# Patient Record
Sex: FEMALE | Race: WHITE | NOT HISPANIC OR LATINO | Employment: OTHER | URBAN - NONMETROPOLITAN AREA
[De-identification: names, ages, dates, MRNs, and addresses within clinical notes are randomized per-mention and may not be internally consistent; named-entity substitution may affect disease eponyms.]

---

## 2024-06-26 PROBLEM — U07.1 COVID-19 AFFECTING PREGNANCY IN THIRD TRIMESTER: Status: ACTIVE | Noted: 2024-06-26

## 2024-06-26 PROBLEM — O42.00 PROM WITH ONSET OF LABOR WITHIN 24 HOURS OF RUPTURE: Status: ACTIVE | Noted: 2024-06-26

## 2024-06-26 PROBLEM — Z3A.39 39 WEEKS GESTATION OF PREGNANCY: Status: ACTIVE | Noted: 2024-06-26

## 2024-06-26 PROBLEM — O98.513 COVID-19 AFFECTING PREGNANCY IN THIRD TRIMESTER: Status: ACTIVE | Noted: 2024-06-26

## 2024-06-26 PROBLEM — O26.893 VAGINAL DISCHARGE DURING PREGNANCY IN THIRD TRIMESTER: Status: ACTIVE | Noted: 2024-06-26

## 2024-06-27 PROBLEM — O98.513 COVID-19 AFFECTING PREGNANCY IN THIRD TRIMESTER: Status: RESOLVED | Noted: 2024-06-26 | Resolved: 2024-06-27

## 2024-06-27 PROBLEM — U07.1 COVID-19 AFFECTING PREGNANCY IN THIRD TRIMESTER: Status: RESOLVED | Noted: 2024-06-26 | Resolved: 2024-06-27

## 2024-06-27 PROBLEM — Z3A.39 39 WEEKS GESTATION OF PREGNANCY: Status: RESOLVED | Noted: 2024-06-26 | Resolved: 2024-06-27

## 2024-06-27 PROBLEM — Z34.90 PREGNANCY: Status: RESOLVED | Noted: 2023-12-03 | Resolved: 2024-06-27

## 2024-06-28 PROBLEM — Z34.90 PREGNANCY: Status: ACTIVE | Noted: 2024-06-28

## 2024-07-09 ENCOUNTER — PATIENT OUTREACH (OUTPATIENT)
Dept: LABOR AND DELIVERY | Facility: HOSPITAL | Age: 39
End: 2024-07-09
Payer: COMMERCIAL

## 2024-07-09 NOTE — OUTREACH NOTE
[facility]  Unable to Reach       Questions/Answers      Flowsheet Row Responses   Pending Outreach Postpartum Check-in   Call Attempt First   Outcome No answer/busy, Left message            [facility]  Postpartum Check-In        7/9/2024, 15:11 CDT

## 2024-07-10 ENCOUNTER — PATIENT OUTREACH (OUTPATIENT)
Dept: LABOR AND DELIVERY | Facility: HOSPITAL | Age: 39
End: 2024-07-10
Payer: COMMERCIAL

## 2024-07-10 NOTE — OUTREACH NOTE
[facility]  Unable to Reach       Questions/Answers      Flowsheet Row Responses   Pending Outreach Postpartum Check-in   Call Attempt Second   Outcome No answer/busy, Left message                Candis Jay RN  Maternity Nurse Navigator    7/10/2024, 08:35 CDT

## 2024-07-11 ENCOUNTER — PATIENT OUTREACH (OUTPATIENT)
Dept: LABOR AND DELIVERY | Facility: HOSPITAL | Age: 39
End: 2024-07-11
Payer: COMMERCIAL

## 2024-07-11 NOTE — OUTREACH NOTE
[facility]  Unable to Reach       Questions/Answers      Flowsheet Row Responses   Pending Outreach Postpartum Check-in   Call Attempt Third   Outcome No answer/busy                Candis Jay RN  Maternity Nurse Navigator    7/11/2024, 06:57 CDT

## 2024-07-12 ENCOUNTER — PROCEDURE VISIT (OUTPATIENT)
Dept: FAMILY MEDICINE CLINIC | Facility: CLINIC | Age: 39
End: 2024-07-12
Payer: COMMERCIAL

## 2024-07-12 VITALS
SYSTOLIC BLOOD PRESSURE: 112 MMHG | HEIGHT: 65 IN | HEART RATE: 63 BPM | WEIGHT: 149 LBS | RESPIRATION RATE: 18 BRPM | TEMPERATURE: 96.9 F | OXYGEN SATURATION: 99 % | BODY MASS INDEX: 24.83 KG/M2 | DIASTOLIC BLOOD PRESSURE: 70 MMHG

## 2024-07-12 DIAGNOSIS — M99.01 SOMATIC DYSFUNCTION OF SPINE, CERVICAL: ICD-10-CM

## 2024-07-12 DIAGNOSIS — M99.06 SOMATIC DYSFUNCTION OF LOWER EXTREMITY: ICD-10-CM

## 2024-07-12 DIAGNOSIS — M99.07 SOMATIC DYSFUNCTION OF UPPER EXTREMITY: ICD-10-CM

## 2024-07-12 DIAGNOSIS — M99.03 SOMATIC DYSFUNCTION OF SPINE, LUMBAR: ICD-10-CM

## 2024-07-12 DIAGNOSIS — M99.05 SOMATIC DYSFUNCTION OF PELVIC REGION: ICD-10-CM

## 2024-07-12 DIAGNOSIS — M99.00 SOMATIC DYSFUNCTION OF HEAD REGION: ICD-10-CM

## 2024-07-12 DIAGNOSIS — M54.6 THORACOLUMBAR BACK PAIN: ICD-10-CM

## 2024-07-12 DIAGNOSIS — M99.02 SOMATIC DYSFUNCTION OF SPINE, THORACIC: ICD-10-CM

## 2024-07-12 DIAGNOSIS — M54.2 CHRONIC NECK PAIN: Primary | ICD-10-CM

## 2024-07-12 DIAGNOSIS — M99.04 SOMATIC DYSFUNCTION OF SPINE, SACRAL: ICD-10-CM

## 2024-07-12 DIAGNOSIS — M54.50 THORACOLUMBAR BACK PAIN: ICD-10-CM

## 2024-07-12 DIAGNOSIS — G89.29 CHRONIC NECK PAIN: Primary | ICD-10-CM

## 2024-07-12 DIAGNOSIS — M54.6 MIDLINE THORACIC BACK PAIN, UNSPECIFIED CHRONICITY: ICD-10-CM

## 2024-07-12 DIAGNOSIS — M99.08 SEGMENTAL AND SOMATIC DYSFUNCTION OF RIB CAGE: ICD-10-CM

## 2024-07-12 PROCEDURE — 98929 OSTEOPATH MANJ 9-10 REGIONS: CPT | Performed by: FAMILY MEDICINE

## 2024-07-12 PROCEDURE — 99213 OFFICE O/P EST LOW 20 MIN: CPT | Performed by: FAMILY MEDICINE

## 2024-07-12 NOTE — PROGRESS NOTES
"Chief Complaint  Neck Pain, Shoulder Pain, and Back Pain (Lower back pain)    Subjective        Citlaly Kathleen presents to [facility]  Neck Pain     Back Pain      Increased neck and thoracic back pain along with moderate aching low back pain following delivery of first child about 2 weeks ago.  Baby doing well    Objective   Vital Signs:  /70   Pulse 63   Temp 96.9 °F (36.1 °C)   Resp 18   Ht 165.1 cm (65\")   Wt 67.6 kg (149 lb)   SpO2 99%   BMI 24.79 kg/m²   Estimated body mass index is 24.79 kg/m² as calculated from the following:    Height as of this encounter: 165.1 cm (65\").    Weight as of this encounter: 67.6 kg (149 lb).       BMI is within normal parameters. No other follow-up for BMI required.      Physical Exam  Vitals and nursing note reviewed.   Constitutional:       General: She is not in acute distress.     Appearance: She is not diaphoretic.   HENT:      Head: Normocephalic and atraumatic.      Nose: Nose normal.   Eyes:      General: No scleral icterus.        Right eye: No discharge.         Left eye: No discharge.      Conjunctiva/sclera: Conjunctivae normal.   Neck:      Trachea: No tracheal deviation.   Pulmonary:      Effort: Pulmonary effort is normal.   Skin:     General: Skin is warm and dry.      Coloration: Skin is not pale.   Neurological:      Mental Status: She is alert and oriented to person, place, and time.   Psychiatric:         Behavior: Behavior normal.         Thought Content: Thought content normal.         Judgment: Judgment normal.      Osteopathic Structural Exam  Procedure Note for Osteopathic Manipulative Treatment    Pre-procedure diagnoses: Somatic dysfunctions as listed below.  Consent: Oral consent given for Osteopathic Treatment  Post-procedure diagnoses: same  Complications of procedure: none, patient tolerated procedure well    The evaluation including the history, physical exam and the management decisions, indicate " than an appropriate intervention on this date of service is osteopathic manipulative treatment. Oral permission for the osteopathic procedure was obtained. The following treatment methods and the responses for each body region are listed below.        Region Somatic Dysfunction Severity OMT technique Response      Head OA FSrRl Moderate Osteopathy in the cranial field Improved      Cervical C3 FRSL  C5 FRSL  C6 ERSL Moderate Balanced ligamentous tension Improved      Thoracic  T1 FRSL  T2 FRSR  T5 FRSR Moderate  Balanced ligamentous tension  Improved       Lumbar L4 ERSL Moderate Balanced ligamentous tension Improved      Sacral L on L Moderate Balanced ligamentous tension Improved   Pelvic R anterior rotation Moderate Balanced ligamentous tension Improved      Lower Extremities  R anterior fibular head  Moderate  Balanced ligamentous tension Improved       Upper Extremities  R AC joint compression  R humerus  internal rotaion    Moderate  Balanced ligamentous tension  Improved       Rib Cage  R rib 6 exhalation somatic dysfunction  Moderate  Balanced ligamentous tension  Improved        Result Review :                     Assessment and Plan     Diagnoses and all orders for this visit:    1. Chronic neck pain (Primary)    2. Midline thoracic back pain, unspecified chronicity    3. Thoracolumbar back pain    4. Somatic dysfunction of head region    5. Somatic dysfunction of spine, cervical    6. Somatic dysfunction of spine, thoracic    7. Somatic dysfunction of spine, lumbar    8. Somatic dysfunction of spine, sacral    9. Somatic dysfunction of pelvic region    10. Somatic dysfunction of lower extremity    11. Somatic dysfunction of upper extremity    12. Segmental and somatic dysfunction of rib cage    OMT to balance autonomic tone, improve fascial symmetry and respiratory/circulatory/lymphatic compliance  OTC pain meds PRN  F/u PRN

## 2024-07-18 ENCOUNTER — PATIENT OUTREACH (OUTPATIENT)
Dept: CALL CENTER | Facility: HOSPITAL | Age: 39
End: 2024-07-18
Payer: COMMERCIAL

## 2024-07-18 NOTE — OUTREACH NOTE
[facility] Survey      Flowsheet Row Responses   [facility] facility patient discharged from? [place]   Week 1 attempt successful? No   Unsuccessful attempts Attempt 2   Reschedule Tomorrow              Yfn RODRIGUES - Registered Nurse

## 2024-07-18 NOTE — OUTREACH NOTE
[facility] Survey      Flowsheet Row Responses   [facility] facility patient discharged from? [place]   Week 1 attempt successful? No   Unsuccessful attempts Attempt 1   Reschedule Today              Yfn RODRIGUES - Registered Nurse

## 2024-07-19 ENCOUNTER — PATIENT OUTREACH (OUTPATIENT)
Dept: CALL CENTER | Facility: HOSPITAL | Age: 39
End: 2024-07-19
Payer: COMMERCIAL

## 2024-07-19 NOTE — OUTREACH NOTE
[facility] Survey      Flowsheet Row Responses   [facility] facility patient discharged from? [place]   Week 1 attempt successful? No   Unsuccessful attempts Attempt 3              Marylu CARDONA - Licensed Nurse

## 2024-08-07 ENCOUNTER — POSTPARTUM VISIT (OUTPATIENT)
Age: 39
End: 2024-08-07
Payer: COMMERCIAL

## 2024-08-07 VITALS
WEIGHT: 133 LBS | DIASTOLIC BLOOD PRESSURE: 72 MMHG | HEIGHT: 65 IN | BODY MASS INDEX: 22.16 KG/M2 | SYSTOLIC BLOOD PRESSURE: 116 MMHG

## 2024-08-07 DIAGNOSIS — R87.610 ATYPICAL SQUAMOUS CELLS CANNOT EXCLUDE HIGH GRADE SQUAMOUS INTRAEPITHELIAL LESION (ASC-H) ON PAPANICOLAO SMEAR: ICD-10-CM

## 2024-08-07 DIAGNOSIS — Z30.013 ENCOUNTER FOR INITIAL PRESCRIPTION OF INJECTABLE CONTRACEPTIVE: ICD-10-CM

## 2024-08-07 DIAGNOSIS — R87.810 HIGH-RISK HUMAN PAPILLOMAVIRUS (HPV) DNA DETECTED IN CERVICAL SPECIMEN, NOT TYPE 16 OR 18: ICD-10-CM

## 2024-08-07 PROCEDURE — 0503F POSTPARTUM CARE VISIT: CPT | Performed by: ADVANCED PRACTICE MIDWIFE

## 2024-08-07 RX ORDER — MEDROXYPROGESTERONE ACETATE 150 MG/ML
150 INJECTION, SUSPENSION INTRAMUSCULAR
Qty: 1 EACH | Refills: 3 | Status: SHIPPED | OUTPATIENT
Start: 2024-08-07

## 2024-08-07 NOTE — PROGRESS NOTES
Subjective   Chief Complaint   Patient presents with    Postpartum Care     6 week postpartum; she would like to return to using Depo shot as birth control method.  Pt does not want pap during this visit     Citlaly Kathleen is a 38 y.o. year old  presenting to be seen for her postpartum visit.  She had a vaginal delivery with a second degree midline episiotomy.   Prenatal course notable for patient being AMA.    Since delivery she has not been sexually active.  She does not have concerns about post-partum blues/depression.   She is bottle feeding.    The following portions of the patient's history were reviewed and updated as appropriate:problem list, current medications, and allergies    Social History     Socioeconomic History    Marital status:    Tobacco Use    Smoking status: Never     Passive exposure: Never    Smokeless tobacco: Never   Vaping Use    Vaping status: Never Used   Substance and Sexual Activity    Alcohol use: Yes     Alcohol/week: 4.0 standard drinks of alcohol     Types: 4 Glasses of wine per week    Drug use: No    Sexual activity: Yes     Partners: Male     Birth control/protection: None     Review of Systems   Constitutional:  Positive for fatigue. Negative for chills and fever.   Respiratory:  Negative for chest tightness and shortness of breath.    Cardiovascular:  Negative for chest pain, palpitations and leg swelling.   Gastrointestinal:  Negative for abdominal pain, constipation and diarrhea.   Genitourinary:  Negative for difficulty urinating, dysuria, flank pain, frequency, pelvic pain, urgency, vaginal bleeding and vaginal discharge.   Musculoskeletal:  Negative for arthralgias, back pain, gait problem and myalgias.   Skin:  Negative for pallor, rash and wound.   Allergic/Immunologic: Negative for environmental allergies.   Neurological:  Negative for dizziness and headaches.   Hematological:  Negative for adenopathy.   Psychiatric/Behavioral:  Negative for dysphoric  "mood, self-injury and suicidal ideas. The patient is nervous/anxious.          Objective   /72   Ht 165.1 cm (65\")   Wt 60.3 kg (133 lb)   LMP  (LMP Unknown)   Breastfeeding No   BMI 22.13 kg/m²     General:  well developed; well nourished  no acute distress   Abdomen: soft, non-tender; no masses   Pelvis: Not performed.  Decline pap smear during this visit.     Pap Smear 3/8/2022: ASC-H; HPV detected (genotypes 16, 18/45 not detected)     Diagnoses and all orders for this visit:    1. 6 weeks postpartum follow-up (Primary)  Postpartum exam. Discussed healthy lifestyle measures, including healthy diet, adequate hydration, exercise, regular self-care, and sleep hygiene measures. Encouraged to resume regular activities. Release for work/school without restrictions. BMI for Adults education included in the AVS.    Screening today with EPDS and resulting score of 4. Discussed measures of support for mental health, including engaging within a healthy support system, exercise, rest as needed, self-care measures, mindfulness measures, massage, counseling, and medication.  Mood and Anxiety Disorder and Mindfulness-Based Stress Reduction education included in the AVS.   -     medroxyPROGESTERone (DEPO-PROVERA) 150 MG/ML injection; Inject 1 mL into the appropriate muscle as directed by prescriber Every 3 (Three) Months.  Dispense: 1 each; Refill: 3    2. Encounter for initial prescription of injectable contraceptive  Patient already prepared to resume using Depo as her chosen method of contraception. Discussed with her initiating the prescription, effectiveness maximized with compliance, instructions for administration (picking up injection and coming to the office for injection administration, side effects, and signs to report. Will have pregnancy test before administration also. Prescription discussed and sent to the pharmacy. Contraceptive Injection education included in the AVS.  -     " medroxyPROGESTERone (DEPO-PROVERA) 150 MG/ML injection; Inject 1 mL into the appropriate muscle as directed by prescriber Every 3 (Three) Months.  Dispense: 1 each; Refill: 3    3. Atypical squamous cells cannot exclude high grade squamous intraepithelial lesion (ASC-H) on Papanicolao smear  Education provided and plan for pap smear at follow-up visit.     4. High-risk human papillomavirus (HPV) DNA detected in cervical specimen, not type 16 or 18  Will plan for pap smear at follow-up visit and HPV vaccine education.         Return to the office in 3 months for a gyne visit with surveillance of contraception and pap smear and as needed with concerns.        This note was electronically signed.    Saima Freedman, DNP, APRN, CNM, RNC-OB  8/7/2024

## 2024-08-15 ENCOUNTER — TELEPHONE (OUTPATIENT)
Dept: OBSTETRICS AND GYNECOLOGY | Age: 39
End: 2024-08-15

## 2024-08-15 NOTE — TELEPHONE ENCOUNTER
Called [facility]; pt prescription was placed under maiden name. I encouraged pt to reach out via phone or MyChart if she arrives at pharmacy and there are any further complications

## 2024-08-15 NOTE — TELEPHONE ENCOUNTER
Provider:  ELENA ATWOOD    Caller:ALMA ROSARIA BECK    Phone Number:[phone]     Reason for Call:PATIENT IS CALLING TO SEE OF THE PROVIDER CAN RESUBMIT THE PRESCRIPTION FOR DEPO-PROVERA WAS ADVISED AT THE PHARMACY THAT THEY DIDN'T HAVE IT AT THE TIME SHE WAS TRYING TO FILL IT AND IF THE PROVIDER CAN RESUBMIT THE PRESCRIPTION THEY CAN FILL IT//PLEASE FOLLOW UP

## 2024-08-19 ENCOUNTER — CLINICAL SUPPORT (OUTPATIENT)
Age: 39
End: 2024-08-19
Payer: COMMERCIAL

## 2024-08-19 DIAGNOSIS — Z30.42 DEPO-PROVERA CONTRACEPTIVE STATUS: Primary | ICD-10-CM

## 2024-08-19 PROCEDURE — 96372 THER/PROPH/DIAG INJ SC/IM: CPT | Performed by: ADVANCED PRACTICE MIDWIFE

## 2024-08-19 RX ORDER — MEDROXYPROGESTERONE ACETATE 150 MG/ML
150 INJECTION, SUSPENSION INTRAMUSCULAR ONCE
Status: COMPLETED | OUTPATIENT
Start: 2024-08-19 | End: 2024-08-19

## 2024-08-19 RX ADMIN — MEDROXYPROGESTERONE ACETATE 150 MG: 150 INJECTION, SUSPENSION INTRAMUSCULAR at 10:57

## 2024-09-18 DIAGNOSIS — Z3A.25 25 WEEKS GESTATION OF PREGNANCY: ICD-10-CM

## 2024-09-18 DIAGNOSIS — B00.9 HERPES VIRUS INFECTION IN MOTHER DURING SECOND TRIMESTER OF PREGNANCY: ICD-10-CM

## 2024-09-18 DIAGNOSIS — O98.512 HERPES VIRUS INFECTION IN MOTHER DURING SECOND TRIMESTER OF PREGNANCY: ICD-10-CM

## 2024-09-18 RX ORDER — VALACYCLOVIR HYDROCHLORIDE 500 MG/1
500 TABLET, FILM COATED ORAL DAILY
Qty: 90 TABLET | Refills: 0 | Status: SHIPPED | OUTPATIENT
Start: 2024-09-18

## 2024-10-11 ENCOUNTER — PROCEDURE VISIT (OUTPATIENT)
Dept: FAMILY MEDICINE CLINIC | Facility: CLINIC | Age: 39
End: 2024-10-11
Payer: COMMERCIAL

## 2024-10-11 VITALS
HEIGHT: 65 IN | WEIGHT: 133 LBS | HEART RATE: 89 BPM | TEMPERATURE: 97.1 F | BODY MASS INDEX: 22.16 KG/M2 | DIASTOLIC BLOOD PRESSURE: 80 MMHG | OXYGEN SATURATION: 98 % | SYSTOLIC BLOOD PRESSURE: 100 MMHG

## 2024-10-11 DIAGNOSIS — R42 VERTIGO: ICD-10-CM

## 2024-10-11 DIAGNOSIS — M99.00 SOMATIC DYSFUNCTION OF HEAD REGION: ICD-10-CM

## 2024-10-11 DIAGNOSIS — M99.02 SOMATIC DYSFUNCTION OF SPINE, THORACIC: ICD-10-CM

## 2024-10-11 DIAGNOSIS — M99.01 SOMATIC DYSFUNCTION OF SPINE, CERVICAL: ICD-10-CM

## 2024-10-11 DIAGNOSIS — M99.08 SEGMENTAL AND SOMATIC DYSFUNCTION OF RIB CAGE: ICD-10-CM

## 2024-10-11 DIAGNOSIS — M62.838 MUSCLE SPASMS OF NECK: Primary | ICD-10-CM

## 2024-10-11 PROCEDURE — 98926 OSTEOPATH MANJ 3-4 REGIONS: CPT | Performed by: FAMILY MEDICINE

## 2024-10-11 PROCEDURE — 99213 OFFICE O/P EST LOW 20 MIN: CPT | Performed by: FAMILY MEDICINE

## 2024-10-27 NOTE — PROGRESS NOTES
"Chief Complaint  Vertigo    Subjective        Citlaly Kathleen presents to [facility]  History of Present Illness  Struggles with intermittent vertigo that worsens when she gets worsened neck tension, responds well to OMT until neck gets tight a few weeks later. Poor response to vestibular rehab    Objective   Vital Signs:  /80 (BP Location: Right arm, Patient Position: Sitting, Cuff Size: Adult)   Pulse 89   Temp 97.1 °F (36.2 °C) (Temporal)   Ht 165.1 cm (65\")   Wt 60.3 kg (133 lb)   SpO2 98%   BMI 22.13 kg/m²   Estimated body mass index is 22.13 kg/m² as calculated from the following:    Height as of this encounter: 165.1 cm (65\").    Weight as of this encounter: 60.3 kg (133 lb).    BMI is within normal parameters. No other follow-up for BMI required.      Physical Exam  Vitals and nursing note reviewed.   Constitutional:       General: She is not in acute distress.     Appearance: She is not diaphoretic.   HENT:      Head: Normocephalic and atraumatic.      Nose: Nose normal.   Eyes:      General: No scleral icterus.        Right eye: No discharge.         Left eye: No discharge.      Conjunctiva/sclera: Conjunctivae normal.   Neck:      Trachea: No tracheal deviation.   Pulmonary:      Effort: Pulmonary effort is normal.   Skin:     General: Skin is warm and dry.      Coloration: Skin is not pale.   Neurological:      Mental Status: She is alert and oriented to person, place, and time.   Psychiatric:         Behavior: Behavior normal.         Thought Content: Thought content normal.         Judgment: Judgment normal.      Osteopathic Structural Exam  Procedure Note for Osteopathic Manipulative Treatment    Pre-procedure diagnoses: Somatic dysfunctions as listed below.  Consent: Oral consent given for Osteopathic Treatment  Post-procedure diagnoses: same  Complications of procedure: none, patient tolerated procedure well    The evaluation including the history, " physical exam and the management decisions, indicate than an appropriate intervention on this date of service is osteopathic manipulative treatment. Oral permission for the osteopathic procedure was obtained. The following treatment methods and the responses for each body region are listed below.        Region Somatic Dysfunction Severity OMT technique Response      Head R temporal bone internal rotation Moderate Osteopathy in the cranial field Improved      Cervical C2 FRSR  C4 ERSL  C5 ERSR  C6 ERSR Moderate Balanced ligamentous tension Improved      Thoracic  T1 FRSR  T3 FRSL Moderate  Balanced ligamentous tension  Improved       Rib Cage  R rib 2 exhalation  somatic dysfunction  Moderate  Balanced ligamentous tension  Improved        Result Review :                Assessment and Plan   Diagnoses and all orders for this visit:    1. Muscle spasms of neck (Primary)  -     Ambulatory Referral to Physical Therapy for Evaluation & Treatment    2. Vertigo    3. Somatic dysfunction of head region    4. Somatic dysfunction of spine, cervical    5. Somatic dysfunction of spine, thoracic    6. Segmental and somatic dysfunction of rib cage    OMT to balance autonomic tone, improve fascial symmetry and respiratory/circulatory/lymphatic compliance  F/u PRN

## 2024-11-12 ENCOUNTER — OFFICE VISIT (OUTPATIENT)
Age: 39
End: 2024-11-12
Payer: COMMERCIAL

## 2024-11-12 VITALS
BODY MASS INDEX: 22.16 KG/M2 | SYSTOLIC BLOOD PRESSURE: 110 MMHG | DIASTOLIC BLOOD PRESSURE: 72 MMHG | HEIGHT: 65 IN | WEIGHT: 133 LBS

## 2024-11-12 DIAGNOSIS — N92.1 BREAKTHROUGH BLEEDING ON DEPO-PROVERA: ICD-10-CM

## 2024-11-12 DIAGNOSIS — Z30.42 ENCOUNTER FOR SURVEILLANCE OF INJECTABLE CONTRACEPTIVE: Primary | ICD-10-CM

## 2024-11-12 PROBLEM — Z34.90 PREGNANCY: Status: RESOLVED | Noted: 2024-06-28 | Resolved: 2024-11-12

## 2024-11-12 PROBLEM — G89.18 EPISIOTOMY PAIN: Status: RESOLVED | Noted: 2024-06-27 | Resolved: 2024-11-12

## 2024-11-12 PROBLEM — R87.810: Status: ACTIVE | Noted: 2024-11-12

## 2024-11-12 PROBLEM — R87.611 ATYPICAL SQUAMOUS CELLS CANNOT EXCLUDE HIGH GRADE SQUAMOUS INTRAEPITHELIAL LESION ON CYTOLOGIC SMEAR OF CERVIX (ASC-H): Status: ACTIVE | Noted: 2024-11-12

## 2024-11-12 RX ORDER — AMITRIPTYLINE HYDROCHLORIDE 75 MG/1
75 TABLET ORAL
COMMUNITY
Start: 2024-08-20

## 2024-11-12 RX ORDER — LISDEXAMFETAMINE DIMESYLATE 40 MG/1
1 CAPSULE ORAL DAILY
COMMUNITY
Start: 2024-09-30

## 2024-11-12 NOTE — PROGRESS NOTES
"Subjective     Citlaly Kathleen is a 39 y.o. female    History of Present Illness  Patient arrived for gyne visit for surveillance of intramuscular contraceptive. Has had some bleeding almost daily since her last injection. She reports it is pretty light now. She is due for her second shot next week. She recalls similar bleeding when she first initiated the Depo-Provera injections previously. Denies other adverse side effects and is satisfied with this method of contraception.         /72   Ht 165.1 cm (65\")   Wt 60.3 kg (133 lb)   LMP 08/19/2024 Comment: pt has been bleeding since Depo shot on this date  BMI 22.13 kg/m²     Outpatient Encounter Medications as of 11/12/2024   Medication Sig Dispense Refill    amitriptyline (ELAVIL) 75 MG tablet Take 1 tablet by mouth every night at bedtime.      doxylamine (UNISOM) 25 MG tablet Take 1 tablet by mouth At Night As Needed for Sleep.      medroxyPROGESTERone (DEPO-PROVERA) 150 MG/ML injection Inject 1 mL into the appropriate muscle as directed by prescriber Every 3 (Three) Months. 1 each 3    valACYclovir (VALTREX) 500 MG tablet TAKE 1 TABLET BY MOUTH EVERY DAY 90 tablet 0    lisdexamfetamine (VYVANSE) 40 MG capsule Take 1 capsule by mouth Daily (Patient not taking: Reported on 11/12/2024)       No facility-administered encounter medications on file as of 11/12/2024.       Surgical History  Past Surgical History:   Procedure Laterality Date    APPENDECTOMY  2001    SINUS SURGERY  2007 & 2009       Family History  Family History   Problem Relation Age of Onset    Alcohol abuse Father     Osteoporosis Mother     Cancer Paternal Grandfather     Heart disease Paternal Grandmother     Cancer Maternal Grandmother     Cancer Maternal Grandfather     Breast cancer Maternal Aunt     Ovarian cancer Neg Hx     Uterine cancer Neg Hx     Colon cancer Neg Hx        The following portions of the patient's history were reviewed and updated as appropriate: allergies, current " medications, past family history, past medical history, past social history, past surgical history, and problem list.    Review of Systems   Constitutional:  Negative for fatigue.   Respiratory:  Negative for shortness of breath.    Cardiovascular:  Negative for chest pain.   Gastrointestinal:  Negative for abdominal pain.   Genitourinary:  Positive for menstrual problem (intermenstrual bleeding) and vaginal bleeding. Negative for amenorrhea, difficulty urinating, dysuria, pelvic pain and vaginal discharge.   Musculoskeletal:  Negative for back pain.   Skin:  Negative for rash and wound.   Neurological:  Negative for dizziness and headache.   Psychiatric/Behavioral:  Negative for dysphoric mood and depressed mood. The patient is not nervous/anxious.        Objective   Physical Exam  Vitals reviewed.   Constitutional:       General: She is not in acute distress.     Appearance: Normal appearance. She is well-developed and normal weight. She is not ill-appearing, toxic-appearing or diaphoretic.   HENT:      Head: Normocephalic.   Eyes:      General: No scleral icterus.        Right eye: No discharge.         Left eye: No discharge.   Cardiovascular:      Rate and Rhythm: Normal rate and regular rhythm.      Heart sounds: Normal heart sounds. No murmur heard.  Pulmonary:      Effort: Pulmonary effort is normal. No respiratory distress.      Breath sounds: Normal breath sounds.   Abdominal:      General: Abdomen is flat. There is no distension.      Palpations: Abdomen is soft.      Tenderness: There is no abdominal tenderness. There is no guarding.   Musculoskeletal:      Cervical back: Normal range of motion.      Right lower leg: No edema.      Left lower leg: No edema.   Skin:     General: Skin is warm and dry.      Coloration: Skin is not jaundiced or pale.   Neurological:      Mental Status: She is alert and oriented to person, place, and time.      Gait: Gait normal.   Psychiatric:         Attention and  Perception: Attention and perception normal.         Mood and Affect: Mood and affect normal.         Speech: Speech normal.         Behavior: Behavior normal. Behavior is cooperative.         Thought Content: Thought content normal.         Judgment: Judgment normal.         Chart reviewed for screenings  Pap Smear: 3/8/2022 ASC-H; HPV detected (not genotypes 16, 18/45)   Mammogram: Begin screening age 40. Family history of breast cancer in maternal aunt.   Colonoscopy: Begin screening age 46. No family history of colon cancer noted.   Bone Density Scan: Begin screening by 5 years after the onset of menopause.         Assessment & Plan   Diagnoses and all orders for this visit:    1. Encounter for surveillance of injectable contraceptive (Primary)  Satisfied with this method and desires to continue. Has refills through her pharmacy. She will  medication and return to the office for injection, which is due by 11/18/2024.     2. Breakthrough bleeding on Depo-Provera  Has lightened up and was only heavy for about 1 week a week or so ago. Recalls this as being part of the early stages with initiating Depo previously. Offered pelvic examination to visualize for other reasons for vaginal bleeding and to complete pap smear. She declined at this time but will return for a gyne visit for annual well woman visit.        BMI is within normal parameters. No other follow-up for BMI required.        Refer to the AVS instructions for additional education provided with today's visit.         Return to the office for a well woman visit and as needed with concerns.          This note has been signed electronically.    Saima Freedman, DNP, APRN, CNM, RNC-OB  11/12/2024

## 2024-11-22 ENCOUNTER — CLINICAL SUPPORT (OUTPATIENT)
Age: 39
End: 2024-11-22
Payer: COMMERCIAL

## 2024-11-22 DIAGNOSIS — Z30.42 ENCOUNTER FOR SURVEILLANCE OF INJECTABLE CONTRACEPTIVE: Primary | ICD-10-CM

## 2024-11-22 LAB
B-HCG UR QL: NEGATIVE
EXPIRATION DATE: NORMAL
INTERNAL NEGATIVE CONTROL: NEGATIVE
INTERNAL POSITIVE CONTROL: POSITIVE
Lab: NORMAL

## 2024-11-22 RX ORDER — MEDROXYPROGESTERONE ACETATE 150 MG/ML
150 INJECTION, SUSPENSION INTRAMUSCULAR ONCE
Status: COMPLETED | OUTPATIENT
Start: 2024-11-22 | End: 2024-11-22

## 2024-11-22 RX ADMIN — MEDROXYPROGESTERONE ACETATE 150 MG: 150 INJECTION, SUSPENSION INTRAMUSCULAR at 09:44

## 2024-12-12 ENCOUNTER — OFFICE VISIT (OUTPATIENT)
Dept: OBSTETRICS AND GYNECOLOGY | Age: 39
End: 2024-12-12
Payer: COMMERCIAL

## 2024-12-12 VITALS
DIASTOLIC BLOOD PRESSURE: 70 MMHG | HEIGHT: 65 IN | SYSTOLIC BLOOD PRESSURE: 100 MMHG | BODY MASS INDEX: 22.33 KG/M2 | WEIGHT: 134 LBS

## 2024-12-12 DIAGNOSIS — Z01.419 WELL WOMAN EXAM WITH ROUTINE GYNECOLOGICAL EXAM: Primary | ICD-10-CM

## 2024-12-12 DIAGNOSIS — Z12.4 CERVICAL CANCER SCREENING: ICD-10-CM

## 2024-12-12 DIAGNOSIS — Z12.31 ENCOUNTER FOR SCREENING MAMMOGRAM FOR MALIGNANT NEOPLASM OF BREAST: ICD-10-CM

## 2024-12-12 PROCEDURE — G0123 SCREEN CERV/VAG THIN LAYER: HCPCS | Performed by: NURSE PRACTITIONER

## 2024-12-12 PROCEDURE — 87624 HPV HI-RISK TYP POOLED RSLT: CPT | Performed by: NURSE PRACTITIONER

## 2024-12-12 NOTE — PROGRESS NOTES
Subjective   Citlaly Kathleen is a 39 y.o. female  YOB: 1985        Chief Complaint   Patient presents with    Gynecologic Exam     Patient is here for annual well GYN Exam. Last well GYN exam and pap 3-8-22,ASC H, HRHPV+.     Patient voices no complaints today.       Gynecologic Exam  The patient's pertinent negatives include no pelvic pain. Pertinent negatives include no abdominal pain, back pain, constipation, diarrhea, dysuria, fever, frequency, hematuria, nausea, rash, sore throat, urgency or vomiting.       The following portions of the patient's history were reviewed and updated as appropriate: allergies, current medications, past family history, past medical history, past social history, past surgical history, and problem list.    No Known Allergies    Past Medical History:   Diagnosis Date    Allergic     Episiotomy pain 06/27/2024    Idiopathic hypersomnia     Migraines     Narcolepsy     Second degree perineal laceration during delivery 06/27/2024       Family History   Problem Relation Age of Onset    Alcohol abuse Father     Osteoporosis Mother     Cancer Paternal Grandfather     Heart disease Paternal Grandmother     Cancer Maternal Grandmother     Cancer Maternal Grandfather     Breast cancer Maternal Aunt     Ovarian cancer Neg Hx     Uterine cancer Neg Hx     Colon cancer Neg Hx        Social History     Socioeconomic History    Marital status:    Tobacco Use    Smoking status: Never     Passive exposure: Never    Smokeless tobacco: Never   Vaping Use    Vaping status: Never Used   Substance and Sexual Activity    Alcohol use: Yes     Alcohol/week: 4.0 standard drinks of alcohol     Types: 4 Glasses of wine per week    Drug use: No    Sexual activity: Yes     Partners: Male     Birth control/protection: None         Current Outpatient Medications:     amitriptyline (ELAVIL) 75 MG tablet, Take 1 tablet by mouth every night at bedtime., Disp: , Rfl:     doxylamine (UNISOM) 25 MG  tablet, Take 1 tablet by mouth At Night As Needed for Sleep., Disp: , Rfl:     medroxyPROGESTERone (DEPO-PROVERA) 150 MG/ML injection, Inject 1 mL into the appropriate muscle as directed by prescriber Every 3 (Three) Months., Disp: 1 each, Rfl: 3    valACYclovir (VALTREX) 500 MG tablet, TAKE 1 TABLET BY MOUTH EVERY DAY, Disp: 90 tablet, Rfl: 0    No LMP recorded (lmp unknown). Patient has had an injection.    Sexual History:           Could not be calculated    Past Surgical History:   Procedure Laterality Date    APPENDECTOMY  2001    SINUS SURGERY  2007 & 2009       Review of Systems   Constitutional:  Negative for activity change, appetite change, fatigue, fever, unexpected weight gain and unexpected weight loss.   HENT:  Negative for congestion, ear pain, hearing loss, nosebleeds, rhinorrhea, sore throat, tinnitus and trouble swallowing.    Eyes:  Negative for blurred vision, pain, discharge, itching and visual disturbance.   Respiratory:  Negative for apnea, chest tightness, shortness of breath and wheezing.    Cardiovascular:  Negative for chest pain and leg swelling.   Gastrointestinal:  Negative for abdominal pain, blood in stool, constipation, diarrhea, nausea, vomiting and GERD.   Endocrine: Negative for heat intolerance, polydipsia and polyuria.   Genitourinary: Negative.  Negative for breast lump, decreased libido, difficulty urinating, dyspareunia, dysuria, frequency, genital sores, hematuria, menstrual problem, pelvic pain, urgency, urinary incontinence and vaginal pain.   Musculoskeletal:  Negative for arthralgias, back pain, joint swelling and myalgias.   Skin:  Negative for color change, rash and skin lesions.   Allergic/Immunologic: Negative for environmental allergies, food allergies and immunocompromised state.   Neurological:  Negative for dizziness, tremors, seizures, syncope, facial asymmetry, numbness and headache.   Hematological:  Negative for adenopathy. Does not bruise/bleed easily.    Psychiatric/Behavioral:  Negative for agitation, hallucinations, sleep disturbance, suicidal ideas and depressed mood. The patient is not nervous/anxious.        Objective   Physical Exam  Vitals reviewed.   Constitutional:       General: She is not in acute distress.     Appearance: She is well-developed. She is not ill-appearing.   HENT:      Head: Normocephalic.      Right Ear: External ear normal.      Left Ear: External ear normal.      Nose: Nose normal.      Mouth/Throat:      Pharynx: No oropharyngeal exudate.   Eyes:      General: No scleral icterus.        Right eye: No discharge.         Left eye: No discharge.      Conjunctiva/sclera: Conjunctivae normal.      Pupils: Pupils are equal, round, and reactive to light.   Neck:      Thyroid: No thyroid mass or thyromegaly.   Cardiovascular:      Rate and Rhythm: Normal rate and regular rhythm.      Heart sounds: Normal heart sounds. No murmur heard.  Pulmonary:      Effort: Pulmonary effort is normal. No respiratory distress.      Breath sounds: Normal breath sounds. No wheezing or rales.   Chest:      Chest wall: No tenderness.   Abdominal:      General: Bowel sounds are normal. There is no distension.      Palpations: Abdomen is soft. There is no mass.      Tenderness: There is no abdominal tenderness. There is no guarding or rebound.      Hernia: No hernia is present.   Genitourinary:     Exam position: Prone.      Labia:         Right: No rash, tenderness or lesion.         Left: No rash, tenderness, lesion or injury.       Vagina: Normal. No vaginal discharge or tenderness.      Cervix: No cervical motion tenderness, discharge or friability.      Uterus: Not enlarged and not tender.       Adnexa:         Right: No mass or tenderness.          Left: No mass or tenderness.        Comments: Urethra and urethral meatus normal.    Bladder - normal, no prolapse.  Perineum and rectum examined - intact and no lesions.    Musculoskeletal:         General: No  "tenderness or deformity. Normal range of motion.      Cervical back: Normal range of motion and neck supple.   Lymphadenopathy:      Cervical: No cervical adenopathy.   Skin:     General: Skin is warm and dry.      Coloration: Skin is not pale.      Findings: No erythema or rash.   Neurological:      Mental Status: She is alert and oriented to person, place, and time.      Motor: No abnormal muscle tone.      Coordination: Coordination normal.      Deep Tendon Reflexes: Reflexes are normal and symmetric.   Psychiatric:         Behavior: Behavior normal. Behavior is cooperative.         Thought Content: Thought content normal.         Judgment: Judgment normal.           Vitals:    12/12/24 0935   BP: 100/70   Weight: 60.8 kg (134 lb)   Height: 165.1 cm (65\")       Diagnoses and all orders for this visit:    1. Well woman exam with routine gynecological exam (Primary)  Comments:  Normal well woman exam.  ThinPrep Pap smear done.  Baseline mammogram ordered.    2. Cervical cancer screening    3. Encounter for screening mammogram for malignant neoplasm of breast  -     Mammo Screening Digital Tomosynthesis Bilateral With CAD; Future        Normal GYN exam. Will have lab work here. Encouraged SBE.  Pt is aware how to do self breast exam and the importance of same. Discussed weight management and importance of maintaining a healthy weight. Discussed Vitamin D intake and the importance of adequate vitamin D for both bone health and a healthy immune system.  Discussed daily exercise and the importance of same in regards to a healthy heart as well as helping to maintain her weight and improving her mental health.  Body mass index is 22.3 kg/m². Colonoscopy is not age appropriate.  Mammogram will be scheduled at not age appropriate. Pap smear is done per ASCCP guidelines.    BMI is within normal parameters. No other follow-up for BMI required.             Non-Smoker    MyChart Instructions Given       "

## 2024-12-16 LAB
GEN CATEG CVX/VAG CYTO-IMP: NORMAL
HPV I/H RISK 4 DNA CVX QL PROBE+SIG AMP: NOT DETECTED
LAB AP CASE REPORT: NORMAL
LAB AP GYN ADDITIONAL INFORMATION: NORMAL
Lab: NORMAL
PATH INTERP SPEC-IMP: NORMAL
STAT OF ADQ CVX/VAG CYTO-IMP: NORMAL

## 2024-12-25 DIAGNOSIS — B00.9 HERPES VIRUS INFECTION IN MOTHER DURING SECOND TRIMESTER OF PREGNANCY: ICD-10-CM

## 2024-12-25 DIAGNOSIS — O98.512 HERPES VIRUS INFECTION IN MOTHER DURING SECOND TRIMESTER OF PREGNANCY: ICD-10-CM

## 2024-12-25 DIAGNOSIS — Z3A.25 25 WEEKS GESTATION OF PREGNANCY: ICD-10-CM

## 2024-12-25 DIAGNOSIS — Z86.19 HISTORY OF HERPES SIMPLEX INFECTION: Primary | ICD-10-CM

## 2024-12-26 RX ORDER — VALACYCLOVIR HYDROCHLORIDE 500 MG/1
500 TABLET, FILM COATED ORAL DAILY
Qty: 90 TABLET | Refills: 0 | Status: SHIPPED | OUTPATIENT
Start: 2024-12-26

## 2025-02-13 ENCOUNTER — TELEPHONE (OUTPATIENT)
Dept: OBSTETRICS AND GYNECOLOGY | Age: 40
End: 2025-02-13
Payer: COMMERCIAL

## 2025-02-13 DIAGNOSIS — Z30.013 ENCOUNTER FOR INITIAL PRESCRIPTION OF INJECTABLE CONTRACEPTIVE: ICD-10-CM

## 2025-02-13 RX ORDER — MEDROXYPROGESTERONE ACETATE 150 MG/ML
150 INJECTION, SUSPENSION INTRAMUSCULAR
Qty: 1 EACH | Refills: 2 | Status: SHIPPED | OUTPATIENT
Start: 2025-02-13

## 2025-02-13 NOTE — TELEPHONE ENCOUNTER
Vicky last saw pt for annual 12/2024 and needs depo refill.  Are you okay with calling in depo as she has not seen anyone else yet?  She needs it this week.  Order is pended for review.

## 2025-02-14 ENCOUNTER — PATIENT MESSAGE (OUTPATIENT)
Dept: FAMILY MEDICINE CLINIC | Facility: CLINIC | Age: 40
End: 2025-02-14
Payer: COMMERCIAL

## 2025-02-14 ENCOUNTER — CLINICAL SUPPORT (OUTPATIENT)
Age: 40
End: 2025-02-14
Payer: COMMERCIAL

## 2025-02-14 DIAGNOSIS — Z30.013 ENCOUNTER FOR INITIAL PRESCRIPTION OF INJECTABLE CONTRACEPTIVE: Primary | ICD-10-CM

## 2025-02-14 DIAGNOSIS — J01.90 ACUTE NON-RECURRENT SINUSITIS, UNSPECIFIED LOCATION: Primary | ICD-10-CM

## 2025-02-14 RX ORDER — MEDROXYPROGESTERONE ACETATE 150 MG/ML
150 INJECTION, SUSPENSION INTRAMUSCULAR ONCE
Status: COMPLETED | OUTPATIENT
Start: 2025-02-14 | End: 2025-02-14

## 2025-02-14 RX ADMIN — MEDROXYPROGESTERONE ACETATE 150 MG: 150 INJECTION, SUSPENSION INTRAMUSCULAR at 10:30

## 2025-03-21 ENCOUNTER — OFFICE VISIT (OUTPATIENT)
Dept: FAMILY MEDICINE CLINIC | Facility: CLINIC | Age: 40
End: 2025-03-21
Payer: COMMERCIAL

## 2025-03-21 VITALS
HEIGHT: 65 IN | HEART RATE: 105 BPM | OXYGEN SATURATION: 99 % | BODY MASS INDEX: 23.06 KG/M2 | DIASTOLIC BLOOD PRESSURE: 90 MMHG | WEIGHT: 138.4 LBS | SYSTOLIC BLOOD PRESSURE: 110 MMHG | TEMPERATURE: 97 F

## 2025-03-21 DIAGNOSIS — M99.00 SOMATIC DYSFUNCTION OF HEAD REGION: ICD-10-CM

## 2025-03-21 DIAGNOSIS — M99.08 SEGMENTAL AND SOMATIC DYSFUNCTION OF RIB CAGE: ICD-10-CM

## 2025-03-21 DIAGNOSIS — J01.90 ACUTE NON-RECURRENT SINUSITIS, UNSPECIFIED LOCATION: ICD-10-CM

## 2025-03-21 DIAGNOSIS — M99.01 SOMATIC DYSFUNCTION OF SPINE, CERVICAL: ICD-10-CM

## 2025-03-21 DIAGNOSIS — M99.07 SOMATIC DYSFUNCTION OF UPPER EXTREMITY: ICD-10-CM

## 2025-03-21 DIAGNOSIS — M54.2 CHRONIC NECK PAIN: Primary | ICD-10-CM

## 2025-03-21 DIAGNOSIS — M99.02 SOMATIC DYSFUNCTION OF SPINE, THORACIC: ICD-10-CM

## 2025-03-21 DIAGNOSIS — G89.29 CHRONIC NECK PAIN: Primary | ICD-10-CM

## 2025-03-24 RX ORDER — AZITHROMYCIN 250 MG/1
TABLET, FILM COATED ORAL
Qty: 6 TABLET | Refills: 0 | Status: SHIPPED | OUTPATIENT
Start: 2025-03-24

## 2025-04-10 NOTE — PROGRESS NOTES
"Chief Complaint  OMT (Patient presents to clinic for treatment of Chronic neck pain) and Sinusitis (Patient C/O sinus pressure, post nasal drainage, dental pain, headaches/Sx's ongoing for 1 month/Tried: Abx)    Subjective        Citlaly Kathleen presents to [facility]  Sinusitis      1 month of worsening sinus congestion similar to sinusitis that she usually gets yearly due to allergies  Neck pain aching, moderate, worse over the past few weeks    Objective   Vital Signs:  /90 (BP Location: Right arm, Patient Position: Sitting, Cuff Size: Adult)   Pulse 105   Temp 97 °F (36.1 °C) (Temporal)   Ht 165.1 cm (65\")   Wt 62.8 kg (138 lb 6.4 oz)   SpO2 99%   BMI 23.03 kg/m²   Estimated body mass index is 23.03 kg/m² as calculated from the following:    Height as of this encounter: 165.1 cm (65\").    Weight as of this encounter: 62.8 kg (138 lb 6.4 oz).    BMI is within normal parameters. No other follow-up for BMI required.      Physical Exam  Vitals and nursing note reviewed.   Constitutional:       General: She is not in acute distress.     Appearance: She is not diaphoretic.   HENT:      Head: Normocephalic and atraumatic.      Nose: Nose normal.   Eyes:      General: No scleral icterus.        Right eye: No discharge.         Left eye: No discharge.      Conjunctiva/sclera: Conjunctivae normal.   Neck:      Trachea: No tracheal deviation.   Pulmonary:      Effort: Pulmonary effort is normal.   Skin:     General: Skin is warm and dry.      Coloration: Skin is not pale.   Neurological:      Mental Status: She is alert and oriented to person, place, and time.   Psychiatric:         Behavior: Behavior normal.         Thought Content: Thought content normal.         Judgment: Judgment normal.      Osteopathic Structural Exam  Procedure Note for Osteopathic Manipulative Treatment    Pre-procedure diagnoses: Somatic dysfunctions as listed below.  Consent: Oral consent given for " Osteopathic Treatment  Post-procedure diagnoses: same  Complications of procedure: none, patient tolerated procedure well    The evaluation including the history, physical exam and the management decisions, indicate than an appropriate intervention on this date of service is osteopathic manipulative treatment. Oral permission for the osteopathic procedure was obtained. The following treatment methods and the responses for each body region are listed below.        Region Somatic Dysfunction Severity OMT technique Response      Head OA ESrRl  Frontospenoidal compression Moderate Osteopathy in the cranial field Improved      Cervical AARL  C5 FRSL Moderate Balanced ligamentous tension Improved      Thoracic  T4 ERSR  T5 ERSL  T6 FRSR   Moderate  Balanced ligamentous tension  Improved       Upper Extremities  R levator scapula spasm  Moderate  Balanced ligamentous tension  Improved       Rib Cage  R > L rib 2 exhalation somatic dysfunction  Moderate  Balanced ligamentous tension  Improved        Result Review :                Assessment and Plan   Diagnoses and all orders for this visit:    1. Chronic neck pain (Primary)    2. Acute non-recurrent sinusitis, unspecified location  -     azithromycin (Zithromax Z-Carlos) 250 MG tablet; Take 2 tablets by mouth on day 1, then 1 tablet daily on days 2-5  Dispense: 6 tablet; Refill: 0    3. Somatic dysfunction of head region    4. Somatic dysfunction of spine, cervical    5. Somatic dysfunction of spine, thoracic    6. Somatic dysfunction of upper extremity    7. Segmental and somatic dysfunction of rib cage    OMT to balance autonomic tone, improve fascial symmetry and respiratory/circulatory/lymphatic compliance  F/u PRN

## 2025-04-26 PROBLEM — R09.89: Status: ACTIVE | Noted: 2025-04-26

## 2025-04-26 PROBLEM — J02.9 PHARYNGITIS: Status: ACTIVE | Noted: 2025-04-26

## 2025-04-26 PROBLEM — R05.1 ACUTE COUGH: Status: ACTIVE | Noted: 2025-04-26

## 2025-04-26 PROBLEM — J01.41 ACUTE RECURRENT PANSINUSITIS: Status: ACTIVE | Noted: 2025-04-26

## 2025-05-15 ENCOUNTER — CLINICAL SUPPORT (OUTPATIENT)
Age: 40
End: 2025-05-15
Payer: COMMERCIAL

## 2025-05-15 DIAGNOSIS — Z30.42 DEPO-PROVERA CONTRACEPTIVE STATUS: Primary | ICD-10-CM

## 2025-05-15 RX ORDER — MEDROXYPROGESTERONE ACETATE 150 MG/ML
150 INJECTION, SUSPENSION INTRAMUSCULAR ONCE
Status: COMPLETED | OUTPATIENT
Start: 2025-05-15 | End: 2025-05-15

## 2025-05-15 RX ADMIN — MEDROXYPROGESTERONE ACETATE 150 MG: 150 INJECTION, SUSPENSION INTRAMUSCULAR at 09:32

## 2025-05-15 NOTE — PROGRESS NOTES
Patient is here today for depo injection. Patient within window and no need for UPT. Patient confirmed name,  and allergies, injection given and patient tolerated very well.

## 2025-05-30 ENCOUNTER — TELEPHONE (OUTPATIENT)
Dept: FAMILY MEDICINE CLINIC | Facility: CLINIC | Age: 40
End: 2025-05-30

## 2025-06-20 ENCOUNTER — PROCEDURE VISIT (OUTPATIENT)
Dept: FAMILY MEDICINE CLINIC | Facility: CLINIC | Age: 40
End: 2025-06-20
Payer: COMMERCIAL

## 2025-06-20 VITALS
SYSTOLIC BLOOD PRESSURE: 110 MMHG | WEIGHT: 134.6 LBS | BODY MASS INDEX: 22.42 KG/M2 | HEIGHT: 65 IN | TEMPERATURE: 97.7 F | DIASTOLIC BLOOD PRESSURE: 80 MMHG

## 2025-06-20 DIAGNOSIS — M99.00 SOMATIC DYSFUNCTION OF HEAD REGION: ICD-10-CM

## 2025-06-20 DIAGNOSIS — M99.02 SOMATIC DYSFUNCTION OF SPINE, THORACIC: ICD-10-CM

## 2025-06-20 DIAGNOSIS — M99.08 SEGMENTAL AND SOMATIC DYSFUNCTION OF RIB CAGE: ICD-10-CM

## 2025-06-20 DIAGNOSIS — M54.2 CHRONIC NECK PAIN: Primary | ICD-10-CM

## 2025-06-20 DIAGNOSIS — M99.09 SEGMENTAL AND SOMATIC DYSFUNCTION OF ABDOMEN AND OTHER REGIONS: ICD-10-CM

## 2025-06-20 DIAGNOSIS — M99.07 SOMATIC DYSFUNCTION OF UPPER EXTREMITY: ICD-10-CM

## 2025-06-20 DIAGNOSIS — R07.81 RIB PAIN: ICD-10-CM

## 2025-06-20 DIAGNOSIS — M99.01 SOMATIC DYSFUNCTION OF SPINE, CERVICAL: ICD-10-CM

## 2025-06-20 DIAGNOSIS — G89.29 CHRONIC NECK PAIN: Primary | ICD-10-CM

## 2025-06-20 PROCEDURE — 99213 OFFICE O/P EST LOW 20 MIN: CPT | Performed by: FAMILY MEDICINE

## 2025-06-20 PROCEDURE — 98927 OSTEOPATH MANJ 5-6 REGIONS: CPT | Performed by: FAMILY MEDICINE

## 2025-06-24 NOTE — PROGRESS NOTES
"Chief Complaint  Rib Pain (Patient presents to clinic with c/o rib cage pain right sided)    Subjective        Citlaly Kathleen presents to [facility]  History of Present Illness  Has been having more rib pain worse with inhalation since returning from a vacation at the beach, they went deep sea fishing and having to pull her self in and out of the boat ended up straining her ribs resulting in worsening neck pain.  No radicular symptoms    Objective   Vital Signs:  /80 (BP Location: Right arm, Patient Position: Sitting, Cuff Size: Adult)   Temp 97.7 °F (36.5 °C) (Temporal)   Ht 165.1 cm (65\")   Wt 61.1 kg (134 lb 9.6 oz)   BMI 22.40 kg/m²   Estimated body mass index is 22.4 kg/m² as calculated from the following:    Height as of this encounter: 165.1 cm (65\").    Weight as of this encounter: 61.1 kg (134 lb 9.6 oz).    BMI is within normal parameters. No other follow-up for BMI required.      Physical Exam  Vitals and nursing note reviewed.   Constitutional:       General: She is not in acute distress.     Appearance: She is not diaphoretic.   HENT:      Head: Normocephalic and atraumatic.      Nose: Nose normal.   Eyes:      General: No scleral icterus.        Right eye: No discharge.         Left eye: No discharge.      Conjunctiva/sclera: Conjunctivae normal.   Neck:      Trachea: No tracheal deviation.   Pulmonary:      Effort: Pulmonary effort is normal.   Skin:     General: Skin is warm and dry.      Coloration: Skin is not pale.   Neurological:      Mental Status: She is alert and oriented to person, place, and time.   Psychiatric:         Behavior: Behavior normal.         Thought Content: Thought content normal.         Judgment: Judgment normal.      Osteopathic Structural Exam  Procedure Note for Osteopathic Manipulative Treatment    Pre-procedure diagnoses: Somatic dysfunctions as listed below.  Consent: Oral consent given for Osteopathic Treatment  Post-procedure " diagnoses: same  Complications of procedure: none, patient tolerated procedure well    The evaluation including the history, physical exam and the management decisions, indicate than an appropriate intervention on this date of service is osteopathic manipulative treatment. Oral permission for the osteopathic procedure was obtained. The following treatment methods and the responses for each body region are listed below.        Region Somatic Dysfunction Severity OMT technique Response      Head OA ESrRl Moderate Osteopathy in the cranial field Improved      Cervical C3 FRSR  C5 ERSL Moderate Balanced ligamentous tension Improved      Thoracic  T5 FRSR  T4 ERSR  T3 ERSL Moderate  Balanced ligamentous tension  Improved       Upper Extremities  L trapezius fascial compression  Moderate  Balanced ligamentous tension  Improved       Rib Cage  B/L rib 5 exhalation somatic dysfunction  Moderate  Balanced ligamentous tension  Improved       Abdomen & Other Sites  Thoracic diaphragm R crura spasm Moderate  Myofascial Release Improved        Result Review :                Assessment and Plan   Diagnoses and all orders for this visit:    1. Chronic neck pain (Primary)    2. Rib pain    3. Somatic dysfunction of head region    4. Somatic dysfunction of spine, cervical    5. Somatic dysfunction of spine, thoracic    6. Somatic dysfunction of upper extremity    7. Segmental and somatic dysfunction of abdomen and other regions    8. Segmental and somatic dysfunction of rib cage    OMT to balance autonomic tone, improve fascial symmetry and respiratory/circulatory/lymphatic compliance  Stretching, NSAIDs as tolerated  F/u PRN

## 2025-08-14 ENCOUNTER — CLINICAL SUPPORT (OUTPATIENT)
Age: 40
End: 2025-08-14
Payer: COMMERCIAL